# Patient Record
Sex: MALE | Race: WHITE | Employment: FULL TIME | URBAN - METROPOLITAN AREA
[De-identification: names, ages, dates, MRNs, and addresses within clinical notes are randomized per-mention and may not be internally consistent; named-entity substitution may affect disease eponyms.]

---

## 2021-05-23 ENCOUNTER — HOSPITAL ENCOUNTER (EMERGENCY)
Facility: CLINIC | Age: 56
Discharge: HOME | End: 2021-05-24
Attending: EMERGENCY MEDICINE | Admitting: EMERGENCY MEDICINE
Payer: OTHER MISCELLANEOUS

## 2021-05-23 DIAGNOSIS — T14.8XXA MUSCLE LACERATION: ICD-10-CM

## 2021-05-23 DIAGNOSIS — S61.012A LACERATION OF LEFT THUMB WITHOUT FOREIGN BODY WITHOUT DAMAGE TO NAIL, INITIAL ENCOUNTER: ICD-10-CM

## 2021-05-23 PROCEDURE — 12002 RPR S/N/AX/GEN/TRNK2.6-7.5CM: CPT

## 2021-05-23 PROCEDURE — 99283 EMERGENCY DEPT VISIT LOW MDM: CPT

## 2021-05-24 ENCOUNTER — APPOINTMENT (OUTPATIENT)
Dept: GENERAL RADIOLOGY | Facility: CLINIC | Age: 56
End: 2021-05-24
Attending: EMERGENCY MEDICINE
Payer: OTHER MISCELLANEOUS

## 2021-05-24 VITALS
HEART RATE: 83 BPM | OXYGEN SATURATION: 97 % | WEIGHT: 240 LBS | DIASTOLIC BLOOD PRESSURE: 95 MMHG | TEMPERATURE: 96.2 F | RESPIRATION RATE: 16 BRPM | SYSTOLIC BLOOD PRESSURE: 140 MMHG

## 2021-05-24 PROCEDURE — 73130 X-RAY EXAM OF HAND: CPT | Mod: LT

## 2021-05-24 ASSESSMENT — ENCOUNTER SYMPTOMS
WOUND: 1
NUMBNESS: 0
WEAKNESS: 0

## 2021-05-24 NOTE — ED TRIAGE NOTES
Tried to fix a headlight by hitting it with his hand, but it shattered and cut the palm of his left hand near the thumb.

## 2021-05-24 NOTE — ED PROVIDER NOTES
History   Chief Complaint:  Laceration       HPI   Brando Holcomb is a 56 year old male who presents with a laceration.  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  It accidentally shattered and cut the palm of his left hand.  The cut bled quite briskly at first but is mainly controlled by time of presentation with pressure.  He can move all his fingers and thumb normally and thinks his strength is normal.  Sensation in his hand and fingers is normal.  Tetanus was updated in 2018.      Review of Systems   Skin: Positive for wound.   Neurological: Negative for weakness and numbness.   10 point review of systems performed and is negative except as above and in HPI.     Allergies:  No known drug allergies.     Medications:  No regular medications    Past Medical History:    Inguinal hernia       Past Surgical History:    Inguinal hernia repair     Social History:  Presents to the ED alone    Physical Exam     Patient Vitals for the past 24 hrs:   BP Temp Temp src Pulse Resp SpO2 Weight   05/23/21 2321 (!) 158/87 96.2  F (35.7  C) Temporal 96 16 97 % 108.9 kg (240 lb)       Physical Exam  General: Alert, No distress.   Head: No signs of trauma.   Mouth/Throat: Oropharynx moist.   Eyes: Conjunctivae are normal. Pupils are equal..   Neck: Normal range of motion.    CV: Appears well perfused.  Resp:No respiratory distress.   MSK: Range of motion of the left thumb is intact but strength is diminished with palmar abduction of the thumb. Normal strength in the distal thumb.  Neuro: The patient is alert and interactive. MCGILL. Speech normal. GCS 15  Skin: No lesion or sign of trauma noted. 5.5 cm laceration over the left thenar eminence including the abductor pollicis brevis.    Psych: normal mood and affect. behavior is normal.      Emergency Department Course     Imaging:  Hand x-ray, left (3 views):  No foreign body. Soft tissue thickening of the  thenar eminence. No fracture or dislocation.   Report per radiology.     Procedures    Laceration Repair        LACERATION:  A subcutaneous clean 5.5 cm laceration.      LOCATION:  Left thenar eminence.      FUNCTION:  Distally sensation and circulation are intact.      ANESTHESIA:  Local using 1% Lidocaine       PREPARATION:  Irrigation and Scrubbing with Normal Saline and Sea Clens      DEBRIDEMENT:  wound explored, no foreign body found      CLOSURE:  Wound was closed with One Layer.  Skin closed with 10 x 4-0 Ethilon using interrupted sutures.       Emergency Department Course:  Reviewed:  I reviewed nursing notes, vitals and [facility]IC    Assessments:  (0000) I obtained history and examined the patient as noted above.   (0415) I rechecked the patient and performed laceration repair, as above.    Disposition:  The patient was discharged to home.     Impression & Plan     Medical Decision Making:  Findings and exam were consistent with laceration of Left thumb, which was repaired as noted above.  There does appear to be a muscle injury.  There is no evidence at this time of associated fracture or foreign body, deep space infection, or neurovascular injury.  I spoke with hand surgery who will see the patient in clinic in the next 1 - 2 days.  Splint placed.  The sutures will likely be removed after follow up with ortho, ortherwise suture removal in 10-14 days. Indications for immediate return to ER/UR were reviewed and included but are not limited to, redness, fevers, drainage, increasing pain, high fevers, or other concerns. Tetanus is up to date.    Diagnosis:    ICD-10-CM    1. Laceration of left thumb without foreign body without damage to nail, initial encounter  S61.012A    2. Muscle laceration  T14.8XXA        Scribe Disclosure:  I, Candida Mar, am serving as a scribe at 11:57 PM on 5/23/2021 to document services personally performed by Cindy Almodovar MD based on my observations and the provider's statements  to me.         Cindy Almodovar MD  05/24/21 0543

## 2021-05-24 NOTE — DISCHARGE INSTRUCTIONS
Discharge Instructions  Laceration (Cut)    You were seen today for a laceration (cut).  Your provider examined your laceration for any problems such a buried foreign body (like glass, a splinter, or gravel), or injury to blood vessels, tendons, and nerves.  Your provider may have also rinsed and/or scrubbed your laceration to help prevent an infection. It may not be possible to find all problems with your laceration on the first visit; occasionally foreign bodies or a tendon injury can go undetected.    Your laceration may have been closed in one of several ways:  No closure: many wounds will heal just fine without closure.  Stitches: regular stitches that require removal.  Staples: skin staples are often used in the scalp/head.  Wound adhesive (glue): skin glue can be used for certain lacerations and doesn t require removal.  Wound strips (aka Butterfly bandages or steri-strips): these are bandages that help to close a wound.  Absorbable stitches:  dissolving  stitches that go away on their own and usually don t require removal.    A small percentage of wounds will develop an infection regardless of how well the wound is cared for. Antibiotics are generally not indicated to prevent an infection so are only given for a small number of high-risk wounds. Some lacerations are too high risk to close, and are left open to heal because closure can increase the likelihood that an infection will develop.    Remember that all lacerations, no matter how expertly repaired, will cause scarring. We consider many factors, techniques, and materials, in our efforts to provide the best possible cosmetic outcome.    Generally, every Emergency Department visit should have a follow-up clinic visit with either a primary or a specialty clinic/provider. Please follow-up as instructed by your emergency provider today.     Return to the Emergency Department right away if:  You have more redness, swelling, pain, drainage (pus), a bad smell,  or red streaking from your laceration as these symptoms could indicate an infection.  You have a fever of 100.4 F or more.  You have bleeding that you cannot stop at home. If your cut starts to bleed, hold pressure on the bleeding area with a clean cloth or put pressure over the bandage.  If the bleeding does not stop after using constant pressure for 30 minutes, you should return to the Emergency Department for further treatment.  An area past the laceration is cool, pale, or blue compared with the other side, or has a slower return of color when squeezed.  Your dressing seems too tight or starts to get uncomfortable or painful. For children, signs of a problem might be irritability or restlessness.  You have loss of normal function or use of an area, such as being unable to straighten or bend a finger normally.  You have a numb area past the laceration.    Return to the Emergency Department or see your regular provider if:  The laceration starts to come open.   You have something coming out of the cut or a feeling that there is something in the laceration.  Your wound will not heal, or keeps breaking open. There can always be glass, wood, dirt or other things in any wound.  They will not always show up, even on x-rays.  If a wound does not heal, this may be why, and it is important to follow-up with your regular provider.    Home Care:  Take your dressing off in 12-24 hours, or as instructed by your provider, to check your laceration. Remove the dressing sooner if it seems too tight or painful, or if it is getting numb, tingly, or pale past the dressing.  Gently wash your laceration 1-2 times daily with clean water and mild soap. It is okay to shower or run clean water over the laceration, but do not let the laceration soak in water (no swimming).  If your laceration was closed with wound adhesive or strips: pat it dry and leave it open to the air. For all other repairs: after you wash your laceration, or at least  2 times a day, apply antibiotic ointment (such as Neosporin  or Bacitracin ) to the laceration, then cover it with a Band-Aid  or gauze.  Keep the laceration clean. Wear gloves or other protective clothing if you are around dirt.    Follow-up for removal:  If your wound was closed with staples or regular stitches, they need to be removed according to the instructions and timeline specified by your provider today.  If your wound was closed with absorbable ( dissolving ) sutures, they should fall out, dissolve, or not be visible in about one week. If they are still visible, then they should be removed according to the instructions and timeline specified by your provider today.    Scars:  To help minimize scarring:  Wear sunscreen over the healed laceration when out in the sun.  Massage the area regularly once healed.  You may apply Vitamin E to the healed wound.  Wait. Scars improve in appearance over months and years.    If you were given a prescription for medicine here today, be sure to read all of the information (including the package insert) that comes with your prescription.  This will include important information about the medicine, its side effects, and any warnings that you need to know about.  The pharmacist who fills the prescription can provide more information and answer questions you may have about the medicine.  If you have questions or concerns that the pharmacist cannot address, please call or return to the Emergency Department.       Remember that you can always come back to the Emergency Department if you are not able to see your regular provider in the amount of time listed above, if you get any new symptoms, or if there is anything that worries you.    Make sure you talk to Dr. Daigle's office tomorrow.  Keep the splint on until you are seen.